# Patient Record
Sex: FEMALE | Race: BLACK OR AFRICAN AMERICAN | NOT HISPANIC OR LATINO | URBAN - METROPOLITAN AREA
[De-identification: names, ages, dates, MRNs, and addresses within clinical notes are randomized per-mention and may not be internally consistent; named-entity substitution may affect disease eponyms.]

---

## 2022-02-07 ENCOUNTER — PREPPED CHART (OUTPATIENT)
Dept: URBAN - METROPOLITAN AREA CLINIC 33 | Facility: CLINIC | Age: 67
End: 2022-02-07

## 2022-02-07 PROBLEM — H40.052 OCULAR HYPERTENSION: Noted: 2022-02-07

## 2022-02-07 PROBLEM — H44.002 ACUTE ENDOPHTHALMITIS: Status: RESOLVED | Noted: 2022-02-07 | Resolved: 2022-02-07

## 2022-02-07 PROBLEM — H35.352 CME: Status: RESOLVED | Noted: 2022-02-07 | Resolved: 2022-02-07

## 2022-02-07 PROBLEM — H44.002 ACUTE ENDOPHTHALMITIS: Noted: 2022-02-07 | Resolved: 2022-02-07

## 2022-02-07 PROBLEM — H44.002 CHRONIC ENDOPHTHALMITIS: Noted: 2022-02-07

## 2022-02-07 PROBLEM — H44.002 ACUTE ENDOPHTHALMITIS: Noted: 2022-02-07

## 2022-02-07 PROBLEM — H35.352 CME: Status: RECURRENCE | Noted: 2022-02-07 | Resolved: 2022-02-07

## 2022-02-07 PROBLEM — H35.352 CME: Noted: 2022-02-07

## 2022-02-07 PROBLEM — H40.052 OCULAR HYPERTENSION: Noted: 2022-02-07 | Resolved: 2022-02-07

## 2022-02-07 PROBLEM — H44.002 CHRONIC ENDOPHTHALMITIS: Status: RESOLVED | Noted: 2022-02-07 | Resolved: 2022-02-07

## 2022-02-07 PROBLEM — H35.372 EPIRETINAL MEMBRANE: Noted: 2022-02-07 | Resolved: 2022-02-07

## 2022-02-07 PROBLEM — H35.372 EPIRETINAL MEMBRANE: Noted: 2022-02-07

## 2022-02-07 PROBLEM — H40.052 OCULAR HYPERTENSION: Status: RESOLVED | Noted: 2022-02-07 | Resolved: 2022-02-07

## 2022-02-07 PROBLEM — H44.002 CHRONIC ENDOPHTHALMITIS: Noted: 2022-02-07 | Resolved: 2022-02-07

## 2022-02-07 PROBLEM — H35.352 CME: Noted: 2022-02-07 | Resolved: 2022-02-07

## 2022-03-31 ASSESSMENT — TONOMETRY
OD_IOP_MMHG: 18
OS_IOP_MMHG: 19

## 2022-03-31 ASSESSMENT — VISUAL ACUITY
OS_SC: CF 3FT
OD_SC: 20/25-1
OD_PH: 20/20-2

## 2022-04-04 ENCOUNTER — FOLLOW UP (OUTPATIENT)
Dept: URBAN - METROPOLITAN AREA CLINIC 33 | Facility: CLINIC | Age: 67
End: 2022-04-04

## 2022-04-04 DIAGNOSIS — H44.002: ICD-10-CM

## 2022-04-04 DIAGNOSIS — H35.372: ICD-10-CM

## 2022-04-04 DIAGNOSIS — H27.02: ICD-10-CM

## 2022-04-04 DIAGNOSIS — H35.352: ICD-10-CM

## 2022-04-04 DIAGNOSIS — H40.052: ICD-10-CM

## 2022-04-04 PROCEDURE — 92202 OPSCPY EXTND ON/MAC DRAW: CPT

## 2022-04-04 PROCEDURE — 92014 COMPRE OPH EXAM EST PT 1/>: CPT

## 2022-04-04 PROCEDURE — 92134 CPTRZ OPH DX IMG PST SGM RTA: CPT

## 2022-04-04 PROCEDURE — 92250 FUNDUS PHOTOGRAPHY W/I&R: CPT | Mod: NC

## 2022-04-04 ASSESSMENT — VISUAL ACUITY
OD_SC: 20/20-
OS_SC: CF 3FT

## 2022-04-04 ASSESSMENT — TONOMETRY
OD_IOP_MMHG: 15
OS_IOP_MMHG: 10

## 2022-06-06 ENCOUNTER — FOLLOW UP (OUTPATIENT)
Dept: URBAN - METROPOLITAN AREA CLINIC 33 | Facility: CLINIC | Age: 67
End: 2022-06-06

## 2022-06-06 DIAGNOSIS — H44.002: ICD-10-CM

## 2022-06-06 DIAGNOSIS — H35.372: ICD-10-CM

## 2022-06-06 DIAGNOSIS — H35.352: ICD-10-CM

## 2022-06-06 DIAGNOSIS — H40.052: ICD-10-CM

## 2022-06-06 PROCEDURE — 92134 CPTRZ OPH DX IMG PST SGM RTA: CPT

## 2022-06-06 PROCEDURE — 92014 COMPRE OPH EXAM EST PT 1/>: CPT

## 2022-06-06 PROCEDURE — 92201 OPSCPY EXTND RTA DRAW UNI/BI: CPT

## 2022-06-06 ASSESSMENT — VISUAL ACUITY
OS_SC: CF 2FT
OD_SC: 20/20

## 2022-06-06 ASSESSMENT — TONOMETRY
OS_IOP_MMHG: 17
OD_IOP_MMHG: 17

## 2022-06-09 ENCOUNTER — SURGERY/PROCEDURE (OUTPATIENT)
Dept: URBAN - METROPOLITAN AREA HOSPITAL 17 | Facility: HOSPITAL | Age: 67
End: 2022-06-09

## 2022-06-09 DIAGNOSIS — H27.02: ICD-10-CM

## 2022-06-09 DIAGNOSIS — H35.372: ICD-10-CM

## 2022-06-09 PROCEDURE — 67041 VIT FOR MACULAR PUCKER: CPT | Mod: 80,LT

## 2022-06-09 PROCEDURE — 66985 INSERT LENS PROSTHESIS: CPT | Mod: 59,LT

## 2022-06-10 ENCOUNTER — 1 DAY POST-OP (OUTPATIENT)
Dept: URBAN - METROPOLITAN AREA CLINIC 101 | Facility: CLINIC | Age: 67
End: 2022-06-10

## 2022-06-10 DIAGNOSIS — H44.002: ICD-10-CM

## 2022-06-10 PROCEDURE — 99024 POSTOP FOLLOW-UP VISIT: CPT

## 2022-06-10 ASSESSMENT — VISUAL ACUITY
OS_PH: 20/50-1
OD_SC: 20/20
OS_SC: 20/100-1

## 2022-06-10 ASSESSMENT — TONOMETRY: OS_IOP_MMHG: 11

## 2022-06-20 ENCOUNTER — POST-OP CHECK (OUTPATIENT)
Dept: URBAN - METROPOLITAN AREA CLINIC 33 | Facility: CLINIC | Age: 67
End: 2022-06-20

## 2022-06-20 DIAGNOSIS — Z98.890: ICD-10-CM

## 2022-06-20 DIAGNOSIS — H44.002: ICD-10-CM

## 2022-06-20 PROCEDURE — 99024 POSTOP FOLLOW-UP VISIT: CPT

## 2022-06-20 ASSESSMENT — TONOMETRY
OS_IOP_MMHG: 16
OD_IOP_MMHG: 13

## 2022-06-20 ASSESSMENT — VISUAL ACUITY
OD_SC: 20/20-2
OS_SC: 20/40+1

## 2022-08-01 ENCOUNTER — FOLLOW UP (OUTPATIENT)
Dept: URBAN - METROPOLITAN AREA CLINIC 33 | Facility: CLINIC | Age: 67
End: 2022-08-01

## 2022-08-01 DIAGNOSIS — H35.372: ICD-10-CM

## 2022-08-01 DIAGNOSIS — H35.352: ICD-10-CM

## 2022-08-01 DIAGNOSIS — H44.002: ICD-10-CM

## 2022-08-01 PROCEDURE — 92134 CPTRZ OPH DX IMG PST SGM RTA: CPT

## 2022-08-01 PROCEDURE — 99024 POSTOP FOLLOW-UP VISIT: CPT

## 2022-08-01 ASSESSMENT — VISUAL ACUITY
OD_SC: 20/20
OS_SC: 20/200
OS_PH: 20/50-2

## 2022-08-01 ASSESSMENT — TONOMETRY
OD_IOP_MMHG: 16
OS_IOP_MMHG: 17

## 2022-08-22 ENCOUNTER — FOLLOW UP (OUTPATIENT)
Dept: URBAN - METROPOLITAN AREA CLINIC 33 | Facility: CLINIC | Age: 67
End: 2022-08-22

## 2022-08-22 DIAGNOSIS — Z98.890: ICD-10-CM

## 2022-08-22 DIAGNOSIS — H44.002: ICD-10-CM

## 2022-08-22 DIAGNOSIS — H35.352: ICD-10-CM

## 2022-08-22 PROCEDURE — 92134 CPTRZ OPH DX IMG PST SGM RTA: CPT

## 2022-08-22 PROCEDURE — 92202 OPSCPY EXTND ON/MAC DRAW: CPT | Mod: NC

## 2022-08-22 PROCEDURE — 99024 POSTOP FOLLOW-UP VISIT: CPT

## 2022-08-22 ASSESSMENT — TONOMETRY
OS_IOP_MMHG: 16
OD_IOP_MMHG: 20

## 2022-08-22 ASSESSMENT — VISUAL ACUITY
OS_SC: 20/40-2
OD_SC: 20/20

## 2022-10-03 ENCOUNTER — FOLLOW UP (OUTPATIENT)
Dept: URBAN - METROPOLITAN AREA CLINIC 33 | Facility: CLINIC | Age: 67
End: 2022-10-03

## 2022-10-03 DIAGNOSIS — H35.352: ICD-10-CM

## 2022-10-03 DIAGNOSIS — Z98.890: ICD-10-CM

## 2022-10-03 DIAGNOSIS — H44.002: ICD-10-CM

## 2022-10-03 PROCEDURE — 92134 CPTRZ OPH DX IMG PST SGM RTA: CPT

## 2022-10-03 PROCEDURE — 92202 OPSCPY EXTND ON/MAC DRAW: CPT

## 2022-10-03 PROCEDURE — 92014 COMPRE OPH EXAM EST PT 1/>: CPT

## 2022-10-03 ASSESSMENT — TONOMETRY
OS_IOP_MMHG: 20
OD_IOP_MMHG: 16

## 2022-10-03 ASSESSMENT — VISUAL ACUITY
OD_CC: 20/20-1
OS_CC: 20/60
OS_PH: 20/25-2

## 2022-10-11 ENCOUNTER — APPOINTMENT (RX ONLY)
Dept: URBAN - METROPOLITAN AREA CLINIC 38 | Facility: CLINIC | Age: 67
Setting detail: DERMATOLOGY
End: 2022-10-11

## 2022-10-11 DIAGNOSIS — L81.0 POSTINFLAMMATORY HYPERPIGMENTATION: ICD-10-CM

## 2022-10-11 DIAGNOSIS — L82.0 INFLAMED SEBORRHEIC KERATOSIS: ICD-10-CM

## 2022-10-11 PROCEDURE — ? ADDITIONAL NOTES

## 2022-10-11 PROCEDURE — ? OTHER

## 2022-10-11 PROCEDURE — ? COUNSELING

## 2022-10-11 PROCEDURE — 99202 OFFICE O/P NEW SF 15 MIN: CPT | Mod: 25

## 2022-10-11 PROCEDURE — 17110 DESTRUCTION B9 LES UP TO 14: CPT

## 2022-10-11 PROCEDURE — ? LIQUID NITROGEN

## 2022-10-11 ASSESSMENT — LOCATION DETAILED DESCRIPTION DERM
LOCATION DETAILED: LEFT BUTTOCK
LOCATION DETAILED: RIGHT BUTTOCK
LOCATION DETAILED: MID-OCCIPITAL SCALP

## 2022-10-11 ASSESSMENT — LOCATION SIMPLE DESCRIPTION DERM
LOCATION SIMPLE: RIGHT BUTTOCK
LOCATION SIMPLE: POSTERIOR SCALP
LOCATION SIMPLE: LEFT BUTTOCK

## 2022-10-11 ASSESSMENT — LOCATION ZONE DERM
LOCATION ZONE: TRUNK
LOCATION ZONE: SCALP

## 2022-10-11 NOTE — PROCEDURE: OTHER
Note Text (......Xxx Chief Complaint.): This diagnosis correlates with the
Other (Free Text): Return when new inflammatory lesion(s) for evaluation and/or bx
Detail Level: Detailed
Render Risk Assessment In Note?: no

## 2022-10-11 NOTE — PROCEDURE: LIQUID NITROGEN
Detail Level: Detailed
Add 52 Modifier (Optional): no
Medical Necessity Information: It is in your best interest to select a reason for this procedure from the list below. All of these items fulfill various CMS LCD requirements except the new and changing color options.
Medical Necessity Clause: This procedure was medically necessary because the lesions that were treated were:
Show Spray Paint Technique Variable?: Yes
Consent: The patient's consent was obtained including but not limited to risks of crusting, scabbing, blistering, scarring, darker or lighter pigmentary change, recurrence, incomplete removal and infection.
Post-Care Instructions: I reviewed with the patient in detail post-care instructions. Patient is to wear sunprotection, and avoid picking at any of the treated lesions. Pt may apply Vaseline to crusted or scabbing areas.
Spray Paint Text: The liquid nitrogen was applied to the skin utilizing a spray paint frosting technique.

## 2022-11-03 ENCOUNTER — FOLLOW UP (OUTPATIENT)
Dept: URBAN - METROPOLITAN AREA CLINIC 101 | Facility: CLINIC | Age: 67
End: 2022-11-03

## 2022-11-03 DIAGNOSIS — H35.352: ICD-10-CM

## 2022-11-03 DIAGNOSIS — H40.052: ICD-10-CM

## 2022-11-03 DIAGNOSIS — Z98.890: ICD-10-CM

## 2022-11-03 DIAGNOSIS — H44.002: ICD-10-CM

## 2022-11-03 DIAGNOSIS — H35.372: ICD-10-CM

## 2022-11-03 PROCEDURE — 92134 CPTRZ OPH DX IMG PST SGM RTA: CPT

## 2022-11-03 PROCEDURE — 92202 OPSCPY EXTND ON/MAC DRAW: CPT

## 2022-11-03 PROCEDURE — 92014 COMPRE OPH EXAM EST PT 1/>: CPT

## 2022-11-03 ASSESSMENT — TONOMETRY
OD_IOP_MMHG: 16
OS_IOP_MMHG: 19

## 2022-11-03 ASSESSMENT — VISUAL ACUITY
OS_SC: 20/50+2
OD_SC: 20/20-2

## 2022-11-14 ENCOUNTER — FOLLOW UP (OUTPATIENT)
Dept: URBAN - METROPOLITAN AREA CLINIC 33 | Facility: CLINIC | Age: 67
End: 2022-11-14

## 2022-11-14 DIAGNOSIS — H35.352: ICD-10-CM

## 2022-11-14 DIAGNOSIS — H40.052: ICD-10-CM

## 2022-11-14 DIAGNOSIS — H44.002: ICD-10-CM

## 2022-11-14 DIAGNOSIS — H35.372: ICD-10-CM

## 2022-11-14 DIAGNOSIS — H20.00: ICD-10-CM

## 2022-11-14 PROCEDURE — 92202 OPSCPY EXTND ON/MAC DRAW: CPT

## 2022-11-14 PROCEDURE — 92014 COMPRE OPH EXAM EST PT 1/>: CPT

## 2022-11-14 PROCEDURE — 92134 CPTRZ OPH DX IMG PST SGM RTA: CPT

## 2022-11-14 ASSESSMENT — TONOMETRY
OD_IOP_MMHG: 12
OS_IOP_MMHG: 18

## 2022-11-14 ASSESSMENT — VISUAL ACUITY
OD_SC: 20/25+2
OS_SC: 20/50+2

## 2023-02-07 ENCOUNTER — PROBLEM (OUTPATIENT)
Dept: URBAN - METROPOLITAN AREA CLINIC 33 | Facility: CLINIC | Age: 68
End: 2023-02-07

## 2023-02-07 DIAGNOSIS — H35.352: ICD-10-CM

## 2023-02-07 DIAGNOSIS — Z98.890: ICD-10-CM

## 2023-02-07 PROCEDURE — 92202 OPSCPY EXTND ON/MAC DRAW: CPT

## 2023-02-07 PROCEDURE — 92134 CPTRZ OPH DX IMG PST SGM RTA: CPT

## 2023-02-07 PROCEDURE — 92014 COMPRE OPH EXAM EST PT 1/>: CPT

## 2023-02-07 ASSESSMENT — TONOMETRY
OS_IOP_MMHG: 17
OD_IOP_MMHG: 12

## 2023-02-07 ASSESSMENT — VISUAL ACUITY
OD_SC: 20/25+2
OS_SC: 20/50-3

## 2023-07-03 ENCOUNTER — APPOINTMENT (RX ONLY)
Dept: URBAN - METROPOLITAN AREA CLINIC 38 | Facility: CLINIC | Age: 68
Setting detail: DERMATOLOGY
End: 2023-07-03

## 2023-07-03 DIAGNOSIS — B07.8 OTHER VIRAL WARTS: ICD-10-CM

## 2023-07-03 PROCEDURE — ? COUNSELING

## 2023-07-03 PROCEDURE — 17110 DESTRUCTION B9 LES UP TO 14: CPT

## 2023-07-03 PROCEDURE — ? LIQUID NITROGEN

## 2023-07-03 ASSESSMENT — LOCATION ZONE DERM: LOCATION ZONE: NOSE

## 2023-07-03 ASSESSMENT — LOCATION DETAILED DESCRIPTION DERM: LOCATION DETAILED: NASAL INFRATIP

## 2023-07-03 ASSESSMENT — LOCATION SIMPLE DESCRIPTION DERM: LOCATION SIMPLE: NOSE

## 2023-08-08 ENCOUNTER — FOLLOW UP (OUTPATIENT)
Dept: URBAN - METROPOLITAN AREA CLINIC 33 | Facility: CLINIC | Age: 68
End: 2023-08-08

## 2023-08-08 DIAGNOSIS — Z98.890: ICD-10-CM

## 2023-08-08 DIAGNOSIS — H35.352: ICD-10-CM

## 2023-08-08 PROCEDURE — 92134 CPTRZ OPH DX IMG PST SGM RTA: CPT

## 2023-08-08 PROCEDURE — 92014 COMPRE OPH EXAM EST PT 1/>: CPT

## 2023-08-08 PROCEDURE — 92202 OPSCPY EXTND ON/MAC DRAW: CPT

## 2023-08-08 ASSESSMENT — VISUAL ACUITY
OS_CC: 20/25
OD_CC: 20/25

## 2023-08-08 ASSESSMENT — TONOMETRY
OD_IOP_MMHG: 13
OS_IOP_MMHG: 16

## 2024-01-22 ENCOUNTER — FOLLOW UP (OUTPATIENT)
Dept: URBAN - METROPOLITAN AREA CLINIC 33 | Facility: CLINIC | Age: 69
End: 2024-01-22

## 2024-01-22 DIAGNOSIS — H40.052: ICD-10-CM

## 2024-01-22 DIAGNOSIS — H35.372: ICD-10-CM

## 2024-01-22 DIAGNOSIS — H35.352: ICD-10-CM

## 2024-01-22 DIAGNOSIS — Z98.890: ICD-10-CM

## 2024-01-22 PROCEDURE — 92202 OPSCPY EXTND ON/MAC DRAW: CPT

## 2024-01-22 PROCEDURE — 92014 COMPRE OPH EXAM EST PT 1/>: CPT

## 2024-01-22 PROCEDURE — 92134 CPTRZ OPH DX IMG PST SGM RTA: CPT

## 2024-01-22 ASSESSMENT — TONOMETRY
OS_IOP_MMHG: 29
OD_IOP_MMHG: 18

## 2024-01-22 ASSESSMENT — VISUAL ACUITY
OS_PH: 20/25
OS_CC: 20/30-2
OD_CC: 20/20-2

## 2024-06-03 ENCOUNTER — FOLLOW UP (OUTPATIENT)
Dept: URBAN - METROPOLITAN AREA CLINIC 33 | Facility: CLINIC | Age: 69
End: 2024-06-03

## 2024-06-03 DIAGNOSIS — H35.352: ICD-10-CM

## 2024-06-03 DIAGNOSIS — Z98.890: ICD-10-CM

## 2024-06-03 DIAGNOSIS — H40.052: ICD-10-CM

## 2024-06-03 PROCEDURE — 92202 OPSCPY EXTND ON/MAC DRAW: CPT

## 2024-06-03 PROCEDURE — 92134 CPTRZ OPH DX IMG PST SGM RTA: CPT

## 2024-06-03 PROCEDURE — 92014 COMPRE OPH EXAM EST PT 1/>: CPT

## 2024-06-03 ASSESSMENT — TONOMETRY
OS_IOP_MMHG: 20
OD_IOP_MMHG: 15

## 2024-06-03 ASSESSMENT — VISUAL ACUITY
OS_SC: 20/50+2
OS_PH: 20/20-3
OD_SC: 20/20-1

## 2024-12-02 ENCOUNTER — FOLLOW UP (OUTPATIENT)
Dept: URBAN - METROPOLITAN AREA CLINIC 33 | Facility: CLINIC | Age: 69
End: 2024-12-02

## 2024-12-02 DIAGNOSIS — H35.372: ICD-10-CM

## 2024-12-02 DIAGNOSIS — H35.352: ICD-10-CM

## 2024-12-02 DIAGNOSIS — Z98.890: ICD-10-CM

## 2024-12-02 DIAGNOSIS — H40.052: ICD-10-CM

## 2024-12-02 DIAGNOSIS — H44.002: ICD-10-CM

## 2024-12-02 PROCEDURE — 92014 COMPRE OPH EXAM EST PT 1/>: CPT

## 2024-12-02 PROCEDURE — 92202 OPSCPY EXTND ON/MAC DRAW: CPT

## 2024-12-02 PROCEDURE — 92134 CPTRZ OPH DX IMG PST SGM RTA: CPT

## 2024-12-02 ASSESSMENT — VISUAL ACUITY
OD_SC: 20/20-2
OS_SC: 20/60-1
OS_PH: 20/25-1

## 2024-12-02 ASSESSMENT — TONOMETRY
OD_IOP_MMHG: 15
OS_IOP_MMHG: 14

## (undated) RX ORDER — DORZOLAMIDE HYDROCHLORIDE AND TIMOLOL MALEATE 20; 5 MG/ML; MG/ML: 1 SOLUTION/ DROPS OPHTHALMIC TWICE A DAY